# Patient Record
Sex: MALE | Race: WHITE | NOT HISPANIC OR LATINO | URBAN - METROPOLITAN AREA
[De-identification: names, ages, dates, MRNs, and addresses within clinical notes are randomized per-mention and may not be internally consistent; named-entity substitution may affect disease eponyms.]

---

## 2018-09-10 ENCOUNTER — OFFICE VISIT (OUTPATIENT)
Dept: URBAN - METROPOLITAN AREA CLINIC 33 | Facility: CLINIC | Age: 83
End: 2018-09-10
Payer: COMMERCIAL

## 2018-09-10 DIAGNOSIS — H40.1133 PRIMARY OPEN-ANGLE GLAUCOMA, BILATERAL, SEVERE STAGE: ICD-10-CM

## 2018-09-10 DIAGNOSIS — H25.11 AGE-RELATED NUCLEAR CATARACT, RIGHT EYE: ICD-10-CM

## 2018-09-10 DIAGNOSIS — H35.3132 NONEXUDATIVE AGE-RELATED MACULAR DEGENERATION, BILATERAL, INTERMEDIATE DRY STAGE: Primary | ICD-10-CM

## 2018-09-10 PROCEDURE — 92133 CPTRZD OPH DX IMG PST SGM ON: CPT | Performed by: OPTOMETRIST

## 2018-09-10 PROCEDURE — 99214 OFFICE O/P EST MOD 30 MIN: CPT | Performed by: OPTOMETRIST

## 2018-09-10 RX ORDER — BRIMONIDINE TARTRATE, TIMOLOL MALEATE 2; 5 MG/ML; MG/ML
SOLUTION/ DROPS OPHTHALMIC
Qty: 1 | Refills: 6 | Status: ACTIVE
Start: 2018-09-10

## 2018-09-10 ASSESSMENT — INTRAOCULAR PRESSURE
OD: 16
OS: 22

## 2018-09-10 NOTE — IMPRESSION/PLAN
Impression: Age-related nuclear cataract, right eye: H25.11. Plan: Cataracts account for the patient's complaints. No treatment currently recommended. The patient will monitor vision changes and contact us with any decrease in vision. Monitor for now.

## 2018-09-10 NOTE — IMPRESSION/PLAN
Impression: Primary open-angle glaucoma, bilateral, severe stage: J52.1277. Plan: IOP 16/22 w/ Combigan QD OU. Severe cupping OU w/ RNFL thinning accompanied by severe PPA. Vision loss more affected by severe AMD. Continue Combigan QD OU. 

Order ONH-OCT OU

## 2018-09-10 NOTE — IMPRESSION/PLAN
Impression: Nonexudative age-related macular degeneration, bilateral, intermediate dry stage: H35.3132. Plan: Discussed diagnosis in detail with patient. Discussed treatment options with patient. Use of vitamins has shown to improve the effects of ARMD. Counseling about the benefits and/or risks of the Age-Related Eye Disease Study (AREDS) formulation for preventing progression of age-related macular degeneration (AMD) was provided to the patient and/or caregiver(s). Use of Amsler grid was explained. Will continue to monitor vision and the patient has been instructed to call with any vision changes. STable.  No hemes

## 2020-11-20 ENCOUNTER — OFFICE VISIT CONVERTED (OUTPATIENT)
Dept: FAMILY MEDICINE CLINIC | Facility: CLINIC | Age: 85
End: 2020-11-20
Attending: NURSE PRACTITIONER

## 2020-11-20 ENCOUNTER — CONVERSION ENCOUNTER (OUTPATIENT)
Dept: OTHER | Facility: HOSPITAL | Age: 85
End: 2020-11-20

## 2020-11-24 ENCOUNTER — HOSPITAL ENCOUNTER (OUTPATIENT)
Dept: LAB | Facility: HOSPITAL | Age: 85
Discharge: HOME | End: 2020-11-24
Attending: NURSE PRACTITIONER

## 2020-11-24 LAB
25(OH)D3 SERPL-MCNC: 58.6 NG/ML (ref 30–100)
ALBUMIN SERPL-MCNC: 4.7 G/DL (ref 3.5–5)
ALBUMIN/GLOB SERPL: 1.7 {RATIO} (ref 1.4–2.6)
ALP SERPL-CCNC: 84 U/L (ref 56–155)
ALT SERPL-CCNC: 23 U/L (ref 10–40)
ANION GAP SERPL CALC-SCNC: 20 MMOL/L (ref 8–19)
AST SERPL-CCNC: 19 U/L (ref 15–50)
BASOPHILS # BLD AUTO: 0.08 10*3/UL (ref 0–0.2)
BASOPHILS NFR BLD AUTO: 1.1 % (ref 0–3)
BILIRUB SERPL-MCNC: 0.41 MG/DL (ref 0.2–1.3)
BUN SERPL-MCNC: 17 MG/DL (ref 5–25)
BUN/CREAT SERPL: 15 {RATIO} (ref 6–20)
CALCIUM SERPL-MCNC: 9.4 MG/DL (ref 8.7–10.4)
CHLORIDE SERPL-SCNC: 101 MMOL/L (ref 99–111)
CHOLEST SERPL-MCNC: 184 MG/DL (ref 107–200)
CHOLEST/HDLC SERPL: 2.7 {RATIO} (ref 3–6)
CONV ABS IMM GRAN: 0.04 10*3/UL (ref 0–0.2)
CONV CO2: 23 MMOL/L (ref 22–32)
CONV IMMATURE GRAN: 0.6 % (ref 0–1.8)
CONV TOTAL PROTEIN: 7.5 G/DL (ref 6.3–8.2)
CREAT UR-MCNC: 1.1 MG/DL (ref 0.7–1.2)
DEPRECATED RDW RBC AUTO: 42.9 FL (ref 35.1–43.9)
EOSINOPHIL # BLD AUTO: 1 10*3/UL (ref 0–0.7)
EOSINOPHIL # BLD AUTO: 14.2 % (ref 0–7)
ERYTHROCYTE [DISTWIDTH] IN BLOOD BY AUTOMATED COUNT: 13.3 % (ref 11.6–14.4)
GFR SERPLBLD BASED ON 1.73 SQ M-ARVRAT: 59 ML/MIN/{1.73_M2}
GLOBULIN UR ELPH-MCNC: 2.8 G/DL (ref 2–3.5)
GLUCOSE SERPL-MCNC: 103 MG/DL (ref 70–99)
HCT VFR BLD AUTO: 44.8 % (ref 42–52)
HDLC SERPL-MCNC: 67 MG/DL (ref 40–60)
HGB BLD-MCNC: 14.6 G/DL (ref 14–18)
LDLC SERPL CALC-MCNC: 104 MG/DL (ref 70–100)
LYMPHOCYTES # BLD AUTO: 0.88 10*3/UL (ref 1–5)
LYMPHOCYTES NFR BLD AUTO: 12.5 % (ref 20–45)
MCH RBC QN AUTO: 28.6 PG (ref 27–31)
MCHC RBC AUTO-ENTMCNC: 32.6 G/DL (ref 33–37)
MCV RBC AUTO: 87.8 FL (ref 80–96)
MONOCYTES # BLD AUTO: 0.69 10*3/UL (ref 0.2–1.2)
MONOCYTES NFR BLD AUTO: 9.8 % (ref 3–10)
NEUTROPHILS # BLD AUTO: 4.36 10*3/UL (ref 2–8)
NEUTROPHILS NFR BLD AUTO: 61.8 % (ref 30–85)
NRBC CBCN: 0 % (ref 0–0.7)
OSMOLALITY SERPL CALC.SUM OF ELEC: 290 MOSM/KG (ref 273–304)
PLATELET # BLD AUTO: 191 10*3/UL (ref 130–400)
PMV BLD AUTO: 11.4 FL (ref 9.4–12.4)
POTASSIUM SERPL-SCNC: 4.7 MMOL/L (ref 3.5–5.3)
PSA SERPL-MCNC: 4.18 NG/ML (ref 0–4)
RBC # BLD AUTO: 5.1 10*6/UL (ref 4.7–6.1)
SODIUM SERPL-SCNC: 139 MMOL/L (ref 135–147)
TRIGL SERPL-MCNC: 67 MG/DL (ref 40–150)
TSH SERPL-ACNC: 2.06 M[IU]/L (ref 0.27–4.2)
VLDLC SERPL-MCNC: 13 MG/DL (ref 5–37)
WBC # BLD AUTO: 7.05 10*3/UL (ref 4.8–10.8)

## 2020-11-24 PROCEDURE — 82746 ASSAY OF FOLIC ACID SERUM: CPT

## 2020-11-25 LAB
EST. AVERAGE GLUCOSE BLD GHB EST-MCNC: 134 MG/DL
HBA1C MFR BLD: 6.3 % (ref 3.5–5.7)
VIT B12 SERPL-MCNC: 791 PG/ML (ref 211–911)

## 2020-11-26 ENCOUNTER — LAB REQUISITION (OUTPATIENT)
Dept: LAB | Facility: HOSPITAL | Age: 85
End: 2020-11-26

## 2020-11-26 DIAGNOSIS — Z00.00 ROUTINE GENERAL MEDICAL EXAMINATION AT A HEALTH CARE FACILITY: ICD-10-CM

## 2020-11-26 LAB — FOLATE SERPL-MCNC: 18 NG/ML (ref 4.78–24.2)

## 2021-01-07 ENCOUNTER — OFFICE VISIT CONVERTED (OUTPATIENT)
Dept: CARDIOLOGY | Facility: CLINIC | Age: 86
End: 2021-01-07
Attending: SPECIALIST

## 2021-02-23 ENCOUNTER — HOSPITAL ENCOUNTER (OUTPATIENT)
Dept: VACCINE CLINIC | Facility: HOSPITAL | Age: 86
Discharge: HOME | End: 2021-02-23
Attending: INTERNAL MEDICINE

## 2021-03-25 ENCOUNTER — HOSPITAL ENCOUNTER (OUTPATIENT)
Dept: VACCINE CLINIC | Facility: HOSPITAL | Age: 86
Discharge: HOME | End: 2021-03-25
Attending: INTERNAL MEDICINE

## 2021-05-10 NOTE — H&P
History and Physical      Patient Name: Denver Ledington   Patient ID: [id number]   Sex: Male   YOB: 1932    Primary Care Provider: Charley GONSALEZ   Referring Provider: Charley GONSALEZ    Visit Date: January 7, 2021    Provider: Evaristo Bowles MD   Location: [facility]   Location Address: [address] [place] [address], [address]   [place]  [id number]   Location Phone: [phone]          Chief Complaint     Atrial fibrillation.  Hypertension.       History Of Present Illness  Consult requested by: Charley GONSALEZ   Denver Ledington is an 88 year old /White male with a history of atrial fibrillation who moved here from [place] and wants to establish a cardiologist. He denies any chest pain. He has some mild pedal edema occasionally. No chest pain. No shortness of breath. No syncopal or presyncopal episodes. Cardiac risk factors: History of hypertension positive. Prior smoker. No history of diabetes mellitus.   PAST MEDICAL HISTORY: Hypertension; Hypothyroidism.   FAMILY HISTORY: Positive for diabetes mellitus. Negative for hypertension or heart disease.   PSYCHOSOCIAL HISTORY: Previously smoked, but quit. Rare alcohol use. Daily caffeine. .   CURRENT MEDICATIONS: Eliquis 5 mg b.i.d.; amlodipine besylate 10 mg daily; lisinopril 40 mg daily; metoprolol tartrate 50 mg b.i.d.; levothyroxine 88 mcg daily; Combigan eye drops; triamcinolone acetonide cream p.r.n.   ALLERGIES: No known drug allergies.   CHOLESTEROL STATUS: Unknown.       Review of Systems  · Constitutional  o Admits  o : good general health lately  o Denies  o : fatigue, recent weight changes   · Eyes  o Denies  o : double vision  · HENT  o Admits  o : hearing loss or ringing  o Denies  o : chronic sinus problem, swollen glands in neck  · Cardiovascular  o Admits  o : swelling (feet, ankles, hands)  o Denies  o : chest pain, palpitations (fast, fluttering, or skipping beats), shortness of breath while walking  "or lying flat  · Respiratory  o Admits  o : asthma or wheezing  o Denies  o : COPD  · Gastrointestinal  o Denies  o : ulcers, nausea or vomiting  · Neurologic  o Denies  o : lightheaded or dizzy, stroke, headaches  · Musculoskeletal  o Admits  o : joint pain  o Denies  o : back pain  · Endocrine  o Admits  o : thyroid disease, heat or cold intolerance, excessive thirst or urination  o Denies  o : diabetes  · Heme-Lymph  o Denies  o : bleeding or bruising tendency, anemia      Vitals  Date Time BP Position Site L\R Cuff Size HR RR TEMP (F) WT  HT  BMI kg/m2 BSA m2 O2 Sat FR L/min FiO2 HC       01/07/2021 12:38 /70 Sitting    77 - R   154lbs 0oz 5'  7\" 24.12 1.82             Physical Examination  · Constitutional  o Appearance  o : Awake, alert, cooperative, pleasant.  · Eyes  o Pupils and Irises  o : Pupils equal and reacting to light and accommodation.  · Ears, Nose, Mouth and Throat  o Ears  o : Tympanic membranes are normal.  o Nose  o : Clear with no maxillary tenderness.  o Oral Cavity  o : Clear.  · Neck  o Inspection/Palpation  o : No JVD, bruits, thyromegaly, or adenopathy. Trachea midline. No axillary or cervical lymphadenopathy.  o Thyroid  o : No thyroid enlargement.   · Respiratory  o Inspection of Chest  o : No chest wall deformities, moving equal.  o Auscultation of Lungs  o : Good air entry with vesicular breath sounds.  o Percussion of Chest  o : Resonant bilaterally.   o Palpation of Chest  o : Equal movements bilaterally.  · Cardiovascular  o Heart  o :   § Auscultation of Heart  § : PMI is in the 5th intercostal space. S1 and S2 regular. No S3. No S4.   o Peripheral Vascular System  o :   § Extremities  § : No pedal edema. Peripheral pulses well felt. No cyanosis.  · Gastrointestinal  o Abdominal Examination  o : No organomegaly, masses, tenderness, bruits, or abnormal pulsations. Bowel sounds are normal.  · Musculoskeletal  o General  o : Muscle strength is normal with normal tone.  · Skin " and Subcutaneous Tissue  o General Inspection  o : No rash, petechiae, or suspicious skin lesions. Skin turgor is normal.  · EKG  o EKG  o : Performed in the office today.  o Indications  o : Atrial fibrillation.  o Results  o : Atrial fibrillation with controlled heart rate.           Assessment     ASSESSMENT & PLAN:    1.  Permanent atrial fibrillation with controlled heart rate.  Continue Eliquis for stroke prevention.  Continue        metoprolol for rate control.  Do an echocardiogram next visit to evaluate the left ventricular systolic        function and to evaluate for any significant valvular abnormalities.    2.  Essential hypertension, controlled.  Continue amlodipine and lisinopril.             Electronically Signed by: Lamar Irving-, Other -Author on January 11, 2021 09:11:53 AM  Electronically Co-signed by: Evaristo Bowles MD -Reviewer on January 11, 2021 12:15:59 PM

## 2021-05-13 NOTE — PROGRESS NOTES
Progress Note      Patient Name: Denver Ledington   Patient ID: [id number]   Sex: Male   YOB: 1932    Primary Care Provider: Charley GONSALEZ   Referring Provider: Charley GONSALEZ    Visit Date: November 20, 2020    Provider: SUNSHINE Lynn   Location: [facility]   Location Address: [address] [place] [address], [address]  [place], [place]  [id number]   Location Phone: [phone]          Chief Complaint  · NEW PATIENT ESTABLISH CARE      History Of Present Illness  Denver Ledington is a 88 year old /White male who presents for evaluation and treatment of:      Patient is here today to establish care.    Says he moved here from [place] need a PCP and some referrals. Would like labs too.    AFib-states he was dx 2 yrs ago-he was asymptomatic at the time-[facility]-he was sent to ER due to the Afib-states he was hospitalized overnight then sent home on Eliquis, metoprolol, lisinopril and Norvasc.     hypothyroid-takes levothyroxine 88mcg-last TSH WNL.     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 Reports he was dx 20yrs ago. Declared legally blind-he no longer drives due to the MD. Also hx of cataract sx.     Arthritis-reports hx of arthritis but denies pain. He does not take medication for the arthritis.     denies pneumonia, shingles, hep A or flu vaccines today.    Tdap up to date-states he fell a yr ago and had one at that time.    hx inguinal hernia repair and umbilical hernia repair in 2010.            Past Surgical History  Procedure Name Date Notes   Cataract surgery 2010 --    Hernia 2010 --          Medication List  Name Date Started Instructions   amlodipine 10 mg oral tablet 11/20/2020 take 1 tablet (10 mg) by oral route once daily for 90 days   Combigan 0.2-0.5 % ophthalmic (eye) drops 11/20/2020 instill 1 drop into right eye by ophthalmic route every 12 hours   Eliquis 5 mg oral tablet 11/20/2020 take 1 tablet (5 mg) by oral route 2 times per  day for 90 days   levothyroxine 88 mcg oral tablet 11/20/2020 take 1 tablet (88 mcg) by oral route once daily on an empty stomach 30 minutes before breakfast for 90 days   lisinopril 40 mg oral tablet 11/20/2020 take 1 tablet (40 mg) by oral route once daily for 90 days   lutein oral  --    magnesium oral  --    metoprolol tartrate 50 mg oral tablet 11/20/2020 take 1 tablet (50 mg) by oral route 2 times per day with meals for 90 days   multivitamin oral tablet  take 1 tablet by oral route daily   oregano oil 1,500 mg oral capsule  take 1 capsule by oral route daily   PreserVision AREDS 14320-226-200 unit-mg-unit oral capsule  take 1 capsule by oral route daily   turmeric 400 mg oral capsule  take 1 capsule by oral route daily   Vitamin D3 25 mcg (1,000 unit) oral capsule  take 1 capsule by oral route daily   vitamin E 400 unit oral capsule  take 1 capsule by oral route daily   zinc 50 mg oral tablet  take 1 tablet by oral route daily         Allergy List  Allergen Name Date Reaction Notes   Adhesives --  --  --          Family Medical History  Disease Name Relative/Age Notes   Pancreatic Neoplasm, Malignant Brother/   --    Heart Disease Brother/   --    Lung cancer Mother/   --    Diabetes Mother/   --          Social History  Finding Status Start/Stop Quantity Notes   Alcohol Current some day --/-- --  2x week   Exercises 5 to 6 times per week --  --/-- --  --    Tobacco Former [age] --  --          Review of Systems  · Constitutional  o Denies  o : fatigue, night sweats  · Eyes  o Denies  o : double vision, blurred vision  · HENT  o Denies  o : vertigo, recent head injury  · Cardiovascular  o Denies  o : chest pain, irregular heart beats  · Respiratory  o Denies  o : shortness of breath, productive cough  · Gastrointestinal  o Denies  o : nausea, vomiting  · Genitourinary  o Denies  o : dysuria, urinary retention  · Integument  o Denies  o : hair growth change, new skin lesions  · Neurologic  o Denies  o :  "altered mental status, seizures  · Musculoskeletal  o Denies  o : joint swelling, limitation of motion  · Endocrine  o Denies  o : cold intolerance, heat intolerance  · Heme-Lymph  o Denies  o : petechiae, lymph node enlargement or tenderness  · Allergic-Immunologic  o Denies  o : frequent illnesses      Vitals  Date Time BP Position Site L\R Cuff Size HR RR TEMP (F) WT  HT  BMI kg/m2 BSA m2 O2 Sat FR L/min FiO2        11/20/2020 02:01 /65 Sitting    81 - R   154lbs 4oz 5'  7\" 24.16 1.82 98 %            Physical Examination  · Constitutional  o Appearance  o : alert, in no acute distress, well developed, well-nourished  · Eyes  o Vision  o : Conjuntivae: Normal, Sclerae white, Pupils: PERRL, Cornea: Clear, no lesions bilateral  · Ears, Nose, Mouth and Throat  o Ears  o : Ext. Ears: Normal shape, Non tender, EACs: Normal , TMs: Normal, Hearing: intact to conversational voice bilaterally  o Nose  o : No nasal discharge, Mucosa: normal, Septum: midline, Sinuses: Nontender  o Throat  o : Oropharynx: no inflmation or lesions, Tonsils: within normal limits  · Neck  o Inspection/Palpation  o : Supple, no masses or tenderness, no deformities, Trachea: Midline, ROM: with in normal limits, no neck stiffness, no lymphadenopathy  o Thyroid  o : no thyomegaly, no palpabale masses   · Respiratory  o Auscultation of Lungs  o : normal breath sounds throughout, no wheeze, rhonchi, or crackles  · Cardiovascular  o Heart  o : Regular rate and rhythm, Normal S1,S2 , No cardiac murmers, No S3 or S4 gallop or rubs  · Skin and Subcutaneous Tissue  o General Inspection  o : no rashes, normal skin color, warm and dry  o Digits and Nails  o : no clubbing, cyanosis, deformities or edema present, normal appearing nails  · Neurologic  o Mental Status Examination  o : alert and oriented to time, place, and person. Gait and Station: normal gait, able to stand without difficulty  · Psychiatric  o Judgement and Insight  o : judgment and " insight intact  o Mood and Affect  o : normal mood and affect              Assessment  · Essential hypertension     401.9/I10  · Hypothyroidism     244.9/E03.9  · Vitamin D deficiency     268.9/E55.9  · Screening for depression     V79.0/Z13.89  · Screening for lipid disorders     V77.91/Z13.220  · Screening for prostate cancer     V76.44/Z12.5  · Routine lab draw     V72.60/Z01.89  · B12 deficiency     266.2/E53.8  · Afib     427.31/I48.91  · Macular degeneration     362.50/H35.30  · Arthritis     716.90/M19.90  · Eczema     692.9/L30.9  · Foot pain, bilateral       Pain in right foot     729.5/M79.671  Pain in left foot     729.5/M79.672      Plan  · Orders  o Male Physical Primary Care Panel (CMP, CBC, TSH, Lipid, PSA) [facility] (59946, 60350, 42885, 16252, 05405, ) - V76.44/Z12.5, V77.91/Z13.220, V72.60/Z01.89, 244.9/E03.9 - 11/20/2020  o Vitamin D (25-Hydroxy) Level (32705) - 268.9/E55.9 - 11/20/2020  o ACO-39: Current medications updated and reviewed (, 1159F) - - 11/20/2020  o ACO-18: Negative screen for clinical depression using a standardized tool () - - 11/20/2020  o ACO-14: Influenza immunization was not administered for reasons documented [facility] () - - 11/20/2020  o ACO-13: Fall Risk Screening with no falls in past year or only one fall without injury in the past year (1101F) - - 11/20/2020  o ACO - Pt declines to or was not able to provide an Advance Care Plan or name a Surrogate Decision Maker (1124F) - - 11/20/2020  o CARDIOLOGY CONSULTATION (CARDI) - 427.31/I48.91 - 11/20/2020   [facility]  o DERMATOLOGY CONSULTATION (DERMA) - 692.9/L30.9 - 11/20/2020   [facility]  o PODIATRY CONSULTATION (PODIA) - 729.5/M79.671, 729.5/M79.672 - 11/20/2020     o B12 Folate levels (B12FO) - 266.2/E53.8 - 11/20/2020  · Medications  o amlodipine 10 mg oral tablet   SIG: take 1 tablet (10 mg) by oral route once daily for 90 days   DISP: (90) Tablet with 1 refills  Adjusted on  11/20/2020     o Combigan 0.2-0.5 % ophthalmic (eye) drops   SIG: instill 1 drop into right eye by ophthalmic route every 12 hours   DISP: (3) Metric Drop with 1 refills  Adjusted on 11/20/2020     o Eliquis 5 mg oral tablet   SIG: take 1 tablet (5 mg) by oral route 2 times per day for 90 days   DISP: (180) Tablet with 1 refills  Adjusted on 11/20/2020     o levothyroxine 88 mcg oral tablet   SIG: take 1 tablet (88 mcg) by oral route once daily on an empty stomach 30 minutes before breakfast for 90 days   DISP: (90) Tablet with 1 refills  Adjusted on 11/20/2020     o lisinopril 40 mg oral tablet   SIG: take 1 tablet (40 mg) by oral route once daily for 90 days   DISP: (90) Tablet with 1 refills  Adjusted on 11/20/2020     o metoprolol tartrate 50 mg oral tablet   SIG: take 1 tablet (50 mg) by oral route 2 times per day with meals for 90 days   DISP: (180) Tablet with 1 refills  Adjusted on 11/20/2020     · Instructions  o Patient advised to monitor blood pressure (B/P) at home and journal readings. Patient informed that a B/P reading at home of more than 130/80 is considered hypertension. For readings greater suwe821/90 or higher patient is advised to follow up in the office with readings for management. Patient advised to limit sodium intake.  o Depression Screen completed and scanned into the EMR under the designated folder within the patient's documents.  o Today's PHQ-9 result is 0_  o Take all medications as prescribed/directed.  o Patient was educated/instructed on their diagnosis, treatment and medications prior to discharge from the clinic today.  o Patient instructed to seek medical attention urgently for new or worsening symptoms.  o Call the office with any concerns or questions.  o 6 month follow up  o Electronically Identified Patient Education Materials Provided Electronically  · Disposition  o Call or Return if symptoms worsen or persist.            Electronically Signed by: SUNSHINE Lynn -Author  on November 22, 2020 08:19:03 PM

## 2021-05-14 VITALS
OXYGEN SATURATION: 98 % | HEART RATE: 81 BPM | SYSTOLIC BLOOD PRESSURE: 127 MMHG | WEIGHT: 154.25 LBS | DIASTOLIC BLOOD PRESSURE: 65 MMHG | HEIGHT: 67 IN | BODY MASS INDEX: 24.21 KG/M2

## 2021-05-14 VITALS
BODY MASS INDEX: 24.17 KG/M2 | SYSTOLIC BLOOD PRESSURE: 154 MMHG | DIASTOLIC BLOOD PRESSURE: 70 MMHG | HEART RATE: 77 BPM | WEIGHT: 154 LBS | HEIGHT: 67 IN

## 2021-05-23 ENCOUNTER — TRANSCRIBE ORDERS (OUTPATIENT)
Dept: CARDIOLOGY | Facility: CLINIC | Age: 86
End: 2021-05-23

## 2021-05-23 DIAGNOSIS — I48.91 ATRIAL FIBRILLATION, UNSPECIFIED TYPE (HCC): Primary | ICD-10-CM

## 2021-06-21 ENCOUNTER — TELEPHONE (OUTPATIENT)
Dept: FAMILY MEDICINE CLINIC | Facility: CLINIC | Age: 86
End: 2021-06-21

## 2021-06-21 NOTE — TELEPHONE ENCOUNTER
Caller: CHESTER    Relationship: Other    Best call back number: [phone]    What is the best time to reach you: ANY    What was the call regarding: PATIENT'S DAUGHTER WOULD LIKE LABS DONE BEFORE HIS APPT ON Thursday.  PLEASE ADVISE, THANK YOU.    Do you require a callback: YES

## 2021-06-22 NOTE — TELEPHONE ENCOUNTER
PATIENTS DAUGHTER CALLED AGAIN TO CHECK ON IF THE PATIENTS LAB ORDERS WERE APPROVED AS THEY WANT TO HAVE THE BLOOD WORK COMPLETED BEFORE HIS APPOINTMENT ON 06/24/21. PLEASE CALL AND ADVISE.     NAME: CHESTER  CALLBACK #:  [phone]

## 2021-06-24 ENCOUNTER — OFFICE VISIT (OUTPATIENT)
Dept: FAMILY MEDICINE CLINIC | Facility: CLINIC | Age: 86
End: 2021-06-24

## 2021-06-24 VITALS
SYSTOLIC BLOOD PRESSURE: 96 MMHG | DIASTOLIC BLOOD PRESSURE: 53 MMHG | OXYGEN SATURATION: 99 % | HEART RATE: 68 BPM | BODY MASS INDEX: 24.8 KG/M2 | HEIGHT: 67 IN | WEIGHT: 158 LBS

## 2021-06-24 DIAGNOSIS — R97.20 ELEVATED PSA: ICD-10-CM

## 2021-06-24 DIAGNOSIS — R73.01 IMPAIRED FASTING GLUCOSE: Primary | ICD-10-CM

## 2021-06-24 DIAGNOSIS — Z01.89 ROUTINE LAB DRAW: ICD-10-CM

## 2021-06-24 DIAGNOSIS — E78.5 HYPERLIPIDEMIA, UNSPECIFIED HYPERLIPIDEMIA TYPE: ICD-10-CM

## 2021-06-24 DIAGNOSIS — I10 ESSENTIAL HYPERTENSION: ICD-10-CM

## 2021-06-24 DIAGNOSIS — E55.9 VITAMIN D DEFICIENCY: ICD-10-CM

## 2021-06-24 DIAGNOSIS — R60.9 PERIPHERAL EDEMA: ICD-10-CM

## 2021-06-24 DIAGNOSIS — E03.9 HYPOTHYROIDISM, UNSPECIFIED TYPE: ICD-10-CM

## 2021-06-24 PROBLEM — L30.8 PSORIASIFORM DERMATITIS: Status: ACTIVE | Noted: 2021-06-24

## 2021-06-24 PROBLEM — E03.8 OTHER SPECIFIED HYPOTHYROIDISM: Status: ACTIVE | Noted: 2021-06-24

## 2021-06-24 PROBLEM — I48.11 LONGSTANDING PERSISTENT ATRIAL FIBRILLATION (HCC): Status: ACTIVE | Noted: 2021-06-24

## 2021-06-24 PROBLEM — J30.2 SEASONAL ALLERGIES: Status: ACTIVE | Noted: 2021-06-24

## 2021-06-24 PROCEDURE — 99214 OFFICE O/P EST MOD 30 MIN: CPT | Performed by: NURSE PRACTITIONER

## 2021-06-24 RX ORDER — BRIMONIDINE TARTRATE/TIMOLOL 0.2%-0.5%
DROPS OPHTHALMIC (EYE)
COMMUNITY
Start: 2021-05-11 | End: 2021-08-10

## 2021-06-24 RX ORDER — LEVOTHYROXINE SODIUM 88 UG/1
TABLET ORAL
Qty: 90 TABLET | Refills: 3 | Status: SHIPPED | OUTPATIENT
Start: 2021-06-24

## 2021-06-24 RX ORDER — LISINOPRIL 40 MG/1
40 TABLET ORAL DAILY
COMMUNITY

## 2021-06-24 RX ORDER — METOPROLOL TARTRATE 50 MG/1
50 TABLET, FILM COATED ORAL 2 TIMES DAILY
COMMUNITY

## 2021-06-24 RX ORDER — LEVOTHYROXINE SODIUM 88 MCG
TABLET ORAL
COMMUNITY
Start: 2021-03-26 | End: 2021-06-24

## 2021-06-24 RX ORDER — ZINC GLUCONATE 50 MG
TABLET ORAL
COMMUNITY

## 2021-06-24 RX ORDER — AMLODIPINE BESYLATE 10 MG/1
TABLET ORAL
COMMUNITY
Start: 2021-03-26 | End: 2021-06-24

## 2021-06-24 RX ORDER — APIXABAN 5 MG/1
TABLET, FILM COATED ORAL
COMMUNITY
Start: 2021-03-26 | End: 2021-06-24 | Stop reason: SDUPTHER

## 2021-06-24 RX ORDER — MULTIPLE VITAMINS W/ MINERALS TAB 9MG-400MCG
TAB ORAL
COMMUNITY

## 2021-06-24 RX ORDER — VIT A/VIT C/VIT E/ZINC/COPPER 4296-226
CAPSULE ORAL
COMMUNITY

## 2021-06-24 RX ORDER — CETIRIZINE HYDROCHLORIDE 10 MG/1
TABLET ORAL
COMMUNITY

## 2021-06-24 RX ORDER — TURMERIC 400 MG
CAPSULE ORAL
COMMUNITY

## 2021-06-24 RX ORDER — APIXABAN 5 MG/1
TABLET, FILM COATED ORAL
Qty: 180 TABLET | Refills: 3 | Status: SHIPPED | OUTPATIENT
Start: 2021-06-24

## 2021-06-24 RX ORDER — AMLODIPINE BESYLATE 10 MG/1
TABLET ORAL
Qty: 90 TABLET | Refills: 3 | Status: SHIPPED | OUTPATIENT
Start: 2021-06-24 | End: 2021-06-24

## 2021-06-24 NOTE — PROGRESS NOTES
"Chief Complaint  Follow-up Patient says he is moving to [place] and would like to get checked before he leaves. Says he would like to get labs done due to having some fatigue late in afternoon. Says in mornings he do have energy but it does subsides as the day goes.    Subjective          Denver Ledington presents to [facility]  History of Present Illness    He  has no past medical history on file.     Objective   Vital Signs:   BP 96/53 (BP Location: Left arm, Patient Position: Sitting, Cuff Size: Adult)   Pulse 68   Ht 170.2 cm (67\")   Wt 71.7 kg (158 lb)   SpO2 99%   BMI 24.75 kg/m²     Physical Exam  Neck:      Thyroid: No thyroid mass, thyromegaly or thyroid tenderness.   Cardiovascular:      Rate and Rhythm: Normal rate and regular rhythm.      Pulses: Normal pulses.      Heart sounds: Normal heart sounds.   Pulmonary:      Effort: Pulmonary effort is normal.   Musculoskeletal:      Right lower leg: 3+ Edema present.      Left lower leg: 3+ Edema present.   Neurological:      General: No focal deficit present.      Mental Status: He is alert and oriented to person, place, and time.   Psychiatric:         Mood and Affect: Mood normal.         Behavior: Behavior normal.        Result Review :   The following data was reviewed by: SUNSHINE Joya on 06/24/2021:  CMP    CMP 11/24/20   Glucose 103 (A)   BUN 17   Creatinine 1.10   Sodium 139   Potassium 4.7   Chloride 101   Calcium 9.4   Albumin 4.7   Total Bilirubin 0.41   Alkaline Phosphatase 84   AST (SGOT) 19   ALT (SGPT) 23   (A) Abnormal value            CBC    CBC 11/24/20   WBC 7.05   RBC 5.10   Hemoglobin 14.6   Hematocrit 44.8   MCV 87.8   MCH 28.6   MCHC 32.6 (A)   RDW 13.3   Platelets 191   (A) Abnormal value            CBC w/diff    CBC w/Diff 11/24/20   WBC 7.05   RBC 5.10   Hemoglobin 14.6   Hematocrit 44.8   MCV 87.8   MCH 28.6   MCHC 32.6 (A)   RDW 13.3   Platelets 191   Neutrophil Rel % 61.8 "   Lymphocyte Rel % 12.5 (A)   Monocyte Rel % 9.8   Eosinophil Rel % 14.2 (A)   Basophil Rel % 1.1   (A) Abnormal value            Lipid Panel    Lipid Panel 11/24/20   Total Cholesterol 184   Triglycerides 67   HDL Cholesterol 67 (A)   VLDL Cholesterol 13   LDL Cholesterol  104 (A)   (A) Abnormal value       Comments are available for some flowsheets but are not being displayed.           TSH    TSH 11/24/20   TSH 2.060           Most Recent A1C    HGBA1C Most Recent 11/24/20   Hemoglobin A1C 6.3 (A)   (A) Abnormal value       Comments are available for some flowsheets but are not being displayed.           PSA    PSA 11/24/20   PSA 4.18 (A)   (A) Abnormal value                     Assessment and Plan    Diagnoses and all orders for this visit:    1. Impaired fasting glucose (Primary)  Comments:  last A1C 6.3-ordered repeat.   Orders:  -     Hemoglobin A1c; Future    2. Essential hypertension  Comments:  b/p 120's/65-75-he is hypotensive in the office today-instructed to d/c amlodipine due to peripheral edema.   Orders:  -     Comprehensive Metabolic Panel; Future    3. Hypothyroidism, unspecified type    4. Vitamin D deficiency  -     Vitamin D 25 Hydroxy; Future    5. Routine lab draw  -     CBC & Differential; Future  -     Vitamin B12; Future    6. Hyperlipidemia, unspecified hyperlipidemia type  -     Lipid Panel; Future  -     TSH; Future    7. Elevated PSA  Comments:  last PSA >4-he was referred to urology but the appt was cancelled-ordered repeat and urology consult.  Orders:  -     PSA DIAGNOSTIC  -     Ambulatory Referral to Urology    8. Peripheral edema  Comments:  discontinued amlodipine. Instructed to keep a b/p log for 1-2 wks and drop off for my review. Instructed to wear balwinder hose, elevate extremities and low sodium         Follow Up   Return if symptoms worsen or fail to improve.  Patient was given instructions and counseling regarding his condition or for health maintenance advice. Please see  specific information pulled into the AVS if appropriate.     Denver Ledington  reports that he quit smoking about 57 years ago. His smoking use included cigarettes. He started smoking about 72 years ago. He has a 3.75 pack-year smoking history. He has never used smokeless tobacco..

## 2021-06-28 ENCOUNTER — LAB (OUTPATIENT)
Dept: LAB | Facility: HOSPITAL | Age: 86
End: 2021-06-28

## 2021-06-28 DIAGNOSIS — I10 ESSENTIAL HYPERTENSION: ICD-10-CM

## 2021-06-28 DIAGNOSIS — E55.9 VITAMIN D DEFICIENCY: ICD-10-CM

## 2021-06-28 DIAGNOSIS — Z01.89 ROUTINE LAB DRAW: ICD-10-CM

## 2021-06-28 DIAGNOSIS — E78.5 HYPERLIPIDEMIA, UNSPECIFIED HYPERLIPIDEMIA TYPE: ICD-10-CM

## 2021-06-28 LAB
25(OH)D3 SERPL-MCNC: 47.1 NG/ML
ALBUMIN SERPL-MCNC: 4.4 G/DL (ref 3.5–5.2)
ALBUMIN/GLOB SERPL: 1.8 G/DL
ALP SERPL-CCNC: 67 U/L (ref 39–117)
ALT SERPL W P-5'-P-CCNC: 8 U/L (ref 1–41)
ANION GAP SERPL CALCULATED.3IONS-SCNC: 9 MMOL/L (ref 5–15)
AST SERPL-CCNC: 16 U/L (ref 1–40)
BASOPHILS # BLD AUTO: 0.08 10*3/MM3 (ref 0–0.2)
BASOPHILS NFR BLD AUTO: 1.3 % (ref 0–1.5)
BILIRUB SERPL-MCNC: 0.4 MG/DL (ref 0–1.2)
BUN SERPL-MCNC: 23 MG/DL (ref 8–23)
BUN/CREAT SERPL: 19.7 (ref 7–25)
CALCIUM SPEC-SCNC: 9.1 MG/DL (ref 8.6–10.5)
CHLORIDE SERPL-SCNC: 104 MMOL/L (ref 98–107)
CHOLEST SERPL-MCNC: 177 MG/DL (ref 0–200)
CO2 SERPL-SCNC: 24 MMOL/L (ref 22–29)
CREAT SERPL-MCNC: 1.17 MG/DL (ref 0.76–1.27)
DEPRECATED RDW RBC AUTO: 42.8 FL (ref 37–54)
EOSINOPHIL # BLD AUTO: 0.89 10*3/MM3 (ref 0–0.4)
EOSINOPHIL NFR BLD AUTO: 14.5 % (ref 0.3–6.2)
ERYTHROCYTE [DISTWIDTH] IN BLOOD BY AUTOMATED COUNT: 13.8 % (ref 12.3–15.4)
GFR SERPL CREATININE-BSD FRML MDRD: 59 ML/MIN/1.73
GLOBULIN UR ELPH-MCNC: 2.4 GM/DL
GLUCOSE SERPL-MCNC: 94 MG/DL (ref 65–99)
HCT VFR BLD AUTO: 44.2 % (ref 37.5–51)
HDLC SERPL-MCNC: 49 MG/DL (ref 40–60)
HGB BLD-MCNC: 14.8 G/DL (ref 13–17.7)
IMM GRANULOCYTES # BLD AUTO: 0.03 10*3/MM3 (ref 0–0.05)
IMM GRANULOCYTES NFR BLD AUTO: 0.5 % (ref 0–0.5)
LDLC SERPL CALC-MCNC: 113 MG/DL (ref 0–100)
LDLC/HDLC SERPL: 2.28 {RATIO}
LYMPHOCYTES # BLD AUTO: 0.6 10*3/MM3 (ref 0.7–3.1)
LYMPHOCYTES NFR BLD AUTO: 9.8 % (ref 19.6–45.3)
MCH RBC QN AUTO: 29.2 PG (ref 26.6–33)
MCHC RBC AUTO-ENTMCNC: 33.5 G/DL (ref 31.5–35.7)
MCV RBC AUTO: 87.4 FL (ref 79–97)
MONOCYTES # BLD AUTO: 0.78 10*3/MM3 (ref 0.1–0.9)
MONOCYTES NFR BLD AUTO: 12.7 % (ref 5–12)
NEUTROPHILS NFR BLD AUTO: 3.77 10*3/MM3 (ref 1.7–7)
NEUTROPHILS NFR BLD AUTO: 61.2 % (ref 42.7–76)
NRBC BLD AUTO-RTO: 0 /100 WBC (ref 0–0.2)
PLATELET # BLD AUTO: 173 10*3/MM3 (ref 140–450)
PMV BLD AUTO: 10.8 FL (ref 6–12)
POTASSIUM SERPL-SCNC: 4.3 MMOL/L (ref 3.5–5.2)
PROT SERPL-MCNC: 6.8 G/DL (ref 6–8.5)
PSA SERPL-MCNC: 3.88 NG/ML (ref 0–4)
RBC # BLD AUTO: 5.06 10*6/MM3 (ref 4.14–5.8)
SODIUM SERPL-SCNC: 137 MMOL/L (ref 136–145)
TRIGL SERPL-MCNC: 81 MG/DL (ref 0–150)
TSH SERPL DL<=0.05 MIU/L-ACNC: 2.38 UIU/ML (ref 0.27–4.2)
VIT B12 BLD-MCNC: 467 PG/ML (ref 211–946)
VLDLC SERPL-MCNC: 15 MG/DL (ref 5–40)
WBC # BLD AUTO: 6.15 10*3/MM3 (ref 3.4–10.8)

## 2021-06-28 PROCEDURE — 36415 COLL VENOUS BLD VENIPUNCTURE: CPT

## 2021-06-28 PROCEDURE — 80053 COMPREHEN METABOLIC PANEL: CPT

## 2021-06-28 PROCEDURE — 84443 ASSAY THYROID STIM HORMONE: CPT

## 2021-06-28 PROCEDURE — 82607 VITAMIN B-12: CPT

## 2021-06-28 PROCEDURE — 85025 COMPLETE CBC W/AUTO DIFF WBC: CPT

## 2021-06-28 PROCEDURE — 82306 VITAMIN D 25 HYDROXY: CPT

## 2021-06-28 PROCEDURE — 84153 ASSAY OF PSA TOTAL: CPT | Performed by: NURSE PRACTITIONER

## 2021-06-28 PROCEDURE — 80061 LIPID PANEL: CPT

## 2021-07-06 ENCOUNTER — TELEPHONE (OUTPATIENT)
Dept: FAMILY MEDICINE CLINIC | Facility: CLINIC | Age: 86
End: 2021-07-06

## 2021-07-06 NOTE — TELEPHONE ENCOUNTER
DELETE AFTER REVIEWING: Telephone encounter to be sent to the  pool     Caller: CHESTER CARRIZALES    Relationship: Emergency Contact    Best call back number: [phone]    What is the best time to reach you: ANYTIME      What was the call regarding: PT IS REQUESITNG TO GET HIS MOST RECENT BLOOD WORK RECORDS    Do you require a callback: YES

## 2021-08-10 RX ORDER — BRIMONIDINE TARTRATE/TIMOLOL 0.2%-0.5%
DROPS OPHTHALMIC (EYE)
Qty: 15 ML | Refills: 3 | Status: SHIPPED | OUTPATIENT
Start: 2021-08-10